# Patient Record
Sex: FEMALE | Race: WHITE | NOT HISPANIC OR LATINO | URBAN - METROPOLITAN AREA
[De-identification: names, ages, dates, MRNs, and addresses within clinical notes are randomized per-mention and may not be internally consistent; named-entity substitution may affect disease eponyms.]

---

## 2024-11-05 ENCOUNTER — EMERGENCY (EMERGENCY)
Facility: HOSPITAL | Age: 34
LOS: 1 days | Discharge: HOME | End: 2024-11-05
Attending: EMERGENCY MEDICINE | Admitting: EMERGENCY MEDICINE
Payer: SELF-PAY

## 2024-11-05 VITALS
SYSTOLIC BLOOD PRESSURE: 112 MMHG | RESPIRATION RATE: 17 BRPM | OXYGEN SATURATION: 98 % | HEART RATE: 138 BPM | TEMPERATURE: 98 F | DIASTOLIC BLOOD PRESSURE: 78 MMHG

## 2024-11-05 VITALS
SYSTOLIC BLOOD PRESSURE: 118 MMHG | RESPIRATION RATE: 18 BRPM | HEART RATE: 89 BPM | OXYGEN SATURATION: 99 % | TEMPERATURE: 98 F | DIASTOLIC BLOOD PRESSURE: 80 MMHG

## 2024-11-05 LAB — HCG UR QL: NEGATIVE — SIGNIFICANT CHANGE UP

## 2024-11-05 PROCEDURE — 99222 1ST HOSP IP/OBS MODERATE 55: CPT

## 2024-11-05 PROCEDURE — 99053 MED SERV 10PM-8AM 24 HR FAC: CPT

## 2024-11-05 RX ORDER — IBUPROFEN 200 MG
600 TABLET ORAL ONCE
Refills: 0 | Status: COMPLETED | OUTPATIENT
Start: 2024-11-05 | End: 2024-11-05

## 2024-11-05 RX ORDER — ACETAMINOPHEN 500 MG
975 TABLET ORAL ONCE
Refills: 0 | Status: COMPLETED | OUTPATIENT
Start: 2024-11-05 | End: 2024-11-05

## 2024-11-05 RX ADMIN — Medication 600 MILLIGRAM(S): at 03:39

## 2024-11-05 RX ADMIN — Medication 975 MILLIGRAM(S): at 03:38
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
## 2024-11-05 NOTE — ED PROVIDER NOTE - NSFOLLOWUPINSTRUCTIONS_ED_ALL_ED_FT
Intimate Partner Violence: [place]  Intimate partner violence, also called domestic violence, domestic abuse, or relationship abuse, is a pattern of behaviors used by one partner to gain or maintain power and control over the other partner. Intimate partner violence can happen to women and men and can happen between people who are or were:  .  Dating.  Living together.  It is important to be honest with yourself. If you are in a relationship that is violent, it is critical to take action.    [place] law    [place] law defines domestic violence as:    "A pattern of coercive tactics which can include physical, psychological, sexual, economic, and emotional abuse, perpetrated by one person against an adult intimate partner, with the goal of establishing and maintaining power and control over the victim."    What are the types of intimate partner violence?  Different types of abuse can occur at the same time within the same relationship.  Physical abuse. This includes rough handling, threats with a weapon, throwing objects, pushing, or hitting.  Emotional and psychological abuse. This includes verbal attacks, rejection, humiliation, intimidation, social isolation, or threats. Abuse may also include limiting contact with family and friends.  Sexual assault. Sexual assault is any unwanted sexual activity that occurs without clear permission (consent) from both people. This includes unwanted touching and sexual harassment.  Economic abuse. This includes controlling money, food, transportation, or other belongings.  Stalking. This involves such things as repeated, unwanted phone calls, e-mails, or text messages, or watching the partner affected by the abuse from a distance.  What are some warning signs of intimate partner violence?  Physical signs    Bruises.  Broken bones.  Burns or cuts.  Physical pain.  Head injury.  Emotional and psychological signs    Symptoms of depression, such as:  Tearfulness.  Hopelessness.  Trouble sleeping.  Suicidal thoughts or behavior.  Low self-esteem.  Symptoms of anxiety, such as:  Nervousness.  Fear of the partner.  Heightened startle response.  Avoiding others.  Flashbacks.  Sexual signs    Bruising, swelling, or bleeding of the genital or rectal area.  Signs of an STI, such as genital sores, warts, or discharge coming from the genital area.  Pain in the genital area.  Unintended pregnancy.  Problems with pregnancy.  Fear of having sex.  What are common behaviors of those affected by intimate partner violence?  Those affected by intimate partner violence may:  Be late to work or other events.  Avoid social activities.  Be isolated or kept from seeing friends or family and may have to let their partner know where they are and who they are with.  Make comments about their partner's temper and make excuses for their partner's behavior.  Engage in high-risk sexual behaviors.  Use drugs or alcohol.  Have unhealthy eating behaviors.  What are common feelings of those affected by intimate partner violence?  Those affected by intimate partner violence may feel that they:  Must be careful not to say or do things that trigger their partner's anger.  Cannot do anything right and deserve to be treated badly.  Overreact to their partner's behavior or temper.  Cannot trust other people or cannot trust their own feelings. They often feel emotionally drained or numb.  Are trapped and in danger if they try to leave.  May have their children taken away by their partner.  Where can you get help?  If you do not feel safe searching for help online at home, use a computer at a [employer] to access the internet. Call 981 if you are in immediate danger or need medical help.    Intimate partner violence hotlines and websites    The [place] Domestic Violence Hotline.  24-hour phone hotline: 1-881.625.3157 (SAFE) or 1-973.385.6153 (TTY).  Text: "START" to 72247.  Online: [employer].SimpleHoney  The [facility] Domestic Violence Hotline. This service is available in multiple languages.  Call: 1-985.863.8354. If you are deaf or hard of hearing, dial 711.  The [facility] Domestic Violence Hotline.  Call: 1-848.720.1559 ([employer]). If you are deaf or hard of hearing, dial 311.  The [place] Sexual Assault Hotline.  24-hour phone hotline: 1-411.460.9524 ([employer]anning).  Online: [employer].SimpleHoney  Shelters for persons affected by intimate partner violence    If you are a person who has experienced intimate partner violence, there are resources to help you find a temporary place for you and your children to live (shelter). The specific address of these shelters is often not known to the public. A complete list, by [facility], of domestic counseling centers and shelters in [facility] is online: www.[facility].org/find-help/program-directory.html    Police    Report assaults, threats, and stalking to the police.    Counselors and counseling centers      Counseling can help you manage difficult emotions and empower you to plan for your future safety. The topics you discuss with a counselor are private and confidential. Children of those affected by intimate partner violence might also need counseling to manage stress and anxiety.    The court system    You can work with a  or an advocate to get legal protection against an abuser. Protection includes restraining orders and private addresses. Crimes against you, such as assault, can also be prosecuted through the courts.    For legal information, contact [facility]:  1-866.822.3330 ([place])  Gopeers.org  Follow these instructions at home:  Create a safety plan that includes ways to remain safe while in a relationship with a person who is abusive, while you are planning to leave, or after you leave. This plan may be created by the person affected by intimate partner violence alone or with assistance from the domestic violence hotline staff or local shelter staff. Your safety plan may include:  How to manage emotions.  How to tell friends and family about the abuse.  How to take legal action.  How to create a safe home environment.  How to keep your children safe.  Emergency plans for life-threatening situations.  Get help right away if:  You feel like you are in immediate danger.  You feel like you may hurt yourself or others.  If you ever feel like you may hurt yourself or others, or have thoughts about taking your own life, get help right away. Go to your nearest emergency department or:  Call your local emergency services (911 in the [place].S[place]  Call a suicide crisis helpline, such as the National Suicide Prevention Lifeline at 1-196.863.7573 or 507 in the [place].S. This is open 24 hours a day in the [place].  Text the Crisis Text Line at 747974 (in the U.S.).  Summary  Intimate partner violence is a pattern of behaviors used by one partner to gain or maintain power and control over the other partner.  It is important to be honest with yourself. If you are in a relationship that is violent, it is critical to take action.  If you have experienced intimate partner violence, there are resources to help you find a temporary place for you and your children to live (shelter).  If you are in a relationship with a person who is abusive, there are resources available to you to find ways to leave the relationship.  Create a safety plan to find ways to remain safe while in a relationship with a person who is abusive, while you are planning to leave, or after you leave.  This information is not intended to replace advice given to you by your health care provider. Make sure you discuss any questions you have with your health care provider.
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
## 2024-11-05 NOTE — ED PROVIDER NOTE - EYES, MLM
Clear bilaterally, pupils equal, round and reactive to light. there is R sided scant periorbital echymosis

## 2024-11-05 NOTE — ED CDU PROVIDER INITIAL DAY NOTE - NSFOLLOWUPINSTRUCTIONS_ED_ALL_ED_FT
Intimate Partner Violence: [place]  Intimate partner violence, also called domestic violence, domestic abuse, or relationship abuse, is a pattern of behaviors used by one partner to gain or maintain power and control over the other partner. Intimate partner violence can happen to women and men and can happen between people who are or were:  .  Dating.  Living together.  It is important to be honest with yourself. If you are in a relationship that is violent, it is critical to take action.    [place] law    [place] law defines domestic violence as:    "A pattern of coercive tactics which can include physical, psychological, sexual, economic, and emotional abuse, perpetrated by one person against an adult intimate partner, with the goal of establishing and maintaining power and control over the victim."    What are the types of intimate partner violence?  Different types of abuse can occur at the same time within the same relationship.  Physical abuse. This includes rough handling, threats with a weapon, throwing objects, pushing, or hitting.  Emotional and psychological abuse. This includes verbal attacks, rejection, humiliation, intimidation, social isolation, or threats. Abuse may also include limiting contact with family and friends.  Sexual assault. Sexual assault is any unwanted sexual activity that occurs without clear permission (consent) from both people. This includes unwanted touching and sexual harassment.  Economic abuse. This includes controlling money, food, transportation, or other belongings.  Stalking. This involves such things as repeated, unwanted phone calls, e-mails, or text messages, or watching the partner affected by the abuse from a distance.  What are some warning signs of intimate partner violence?  Physical signs    Bruises.  Broken bones.  Burns or cuts.  Physical pain.  Head injury.  Emotional and psychological signs    Symptoms of depression, such as:  Tearfulness.  Hopelessness.  Trouble sleeping.  Suicidal thoughts or behavior.  Low self-esteem.  Symptoms of anxiety, such as:  Nervousness.  Fear of the partner.  Heightened startle response.  Avoiding others.  Flashbacks.  Sexual signs    Bruising, swelling, or bleeding of the genital or rectal area.  Signs of an STI, such as genital sores, warts, or discharge coming from the genital area.  Pain in the genital area.  Unintended pregnancy.  Problems with pregnancy.  Fear of having sex.  What are common behaviors of those affected by intimate partner violence?  Those affected by intimate partner violence may:  Be late to work or other events.  Avoid social activities.  Be isolated or kept from seeing friends or family and may have to let their partner know where they are and who they are with.  Make comments about their partner's temper and make excuses for their partner's behavior.  Engage in high-risk sexual behaviors.  Use drugs or alcohol.  Have unhealthy eating behaviors.  What are common feelings of those affected by intimate partner violence?  Those affected by intimate partner violence may feel that they:  Must be careful not to say or do things that trigger their partner's anger.  Cannot do anything right and deserve to be treated badly.  Overreact to their partner's behavior or temper.  Cannot trust other people or cannot trust their own feelings. They often feel emotionally drained or numb.  Are trapped and in danger if they try to leave.  May have their children taken away by their partner.  Where can you get help?  If you do not feel safe searching for help online at home, use a computer at a [employer] to access the internet. Call 061 if you are in immediate danger or need medical help.    Intimate partner violence hotlines and websites    The [place] Domestic Violence Hotline.  24-hour phone hotline: 1-914.613.8528 (SAFE) or 1-802.166.6451 (TTY).  Text: "START" to 98550.  Online: [employer].Nexmo  The [facility] Domestic Violence Hotline. This service is available in multiple languages.  Call: 1-364.985.4323. If you are deaf or hard of hearing, dial 711.  The [facility] Domestic Violence Hotline.  Call: 1-107.811.3285 ([employer]). If you are deaf or hard of hearing, dial 311.  The [place] Sexual Assault Hotline.  24-hour phone hotline: 1-665.465.9866 ([employer]).  Online: [employer].Nexmo  Shelters for persons affected by intimate partner violence    If you are a person who has experienced intimate partner violence, there are resources to help you find a temporary place for you and your children to live (shelter). The specific address of these shelters is often not known to the public. A complete list, by [facility], of domestic counseling centers and shelters in [facility] is online: www.[facility].org/find-help/program-directory.html    Police    Report assaults, threats, and stalking to the police.    Counselors and counseling centers      Counseling can help you manage difficult emotions and empower you to plan for your future safety. The topics you discuss with a counselor are private and confidential. Children of those affected by intimate partner violence might also need counseling to manage stress and anxiety.    The court system    You can work with a  or an advocate to get legal protection against an abuser. Protection includes restraining orders and private addresses. Crimes against you, such as assault, can also be prosecuted through the courts.    For legal information, contact [facility]:  1-323.864.1839 ([place])  G.I. Java.org  Follow these instructions at home:  Create a safety plan that includes ways to remain safe while in a relationship with a person who is abusive, while you are planning to leave, or after you leave. This plan may be created by the person affected by intimate partner violence alone or with assistance from the domestic violence hotline staff or local shelter staff. Your safety plan may include:  How to manage emotions.  How to tell friends and family about the abuse.  How to take legal action.  How to create a safe home environment.  How to keep your children safe.  Emergency plans for life-threatening situations.  Get help right away if:  You feel like you are in immediate danger.  You feel like you may hurt yourself or others.  If you ever feel like you may hurt yourself or others, or have thoughts about taking your own life, get help right away. Go to your nearest emergency department or:  Call your local emergency services (911 in the [place].S.)  Call a suicide crisis helpline, such as the National Suicide Prevention Lifeline at 1-103.126.1396 or 928 in the [place].S. This is open 24 hours a day in the [place].S.  Text the Crisis Text Line at 896349 (in the U.S.).  Summary  Intimate partner violence is a pattern of behaviors used by one partner to gain or maintain power and control over the other partner.  It is important to be honest with yourself. If you are in a relationship that is violent, it is critical to take action.  If you have experienced intimate partner violence, there are resources to help you find a temporary place for you and your children to live (shelter).  If you are in a relationship with a person who is abusive, there are resources available to you to find ways to leave the relationship.  Create a safety plan to find ways to remain safe while in a relationship with a person who is abusive, while you are planning to leave, or after you leave.  This information is not intended to replace advice given to you by your health care provider. Make sure you discuss any questions you have with your health care provider.
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
## 2024-11-05 NOTE — ED CDU PROVIDER DISPOSITION NOTE - NSFOLLOWUPINSTRUCTIONS_ED_ALL_ED_FT
General Assault  Assault includes any behavior or physical attack that results in injury or threat to another person or damage to their property. This also includes assault that has not yet happened but is planned to happen, as well as threats that cause fear of assault.    Threats of assault may be physical, verbal, or written. They may be spoken or sent by any form of communication or media. The threats may be direct, implied, or understood.    What are the different forms of assault?  Forms of assault include:  Physically assaulting a person directly by slapping, hitting, kicking, or pushing.  Threats to inflict physical harm, which may include:  Verbal threats with language that is intimidating, hostile, or abusive.  Throwing or hitting objects.  Making intimidating or threatening gestures.  Displaying an object that appears to be a weapon in a threatening manner.  Stalking.  Sexually assaulting a person. Sexual assault is any sexual activity that a person is forced, threatened, or coerced to participate in. It may or may not involve physical contact with the person who is assaulting you. You are sexually assaulted if you are forced to have sexual contact of any kind.  Damaging or destroying a person's assistive equipment, such as glasses, canes, or walkers.  Using or displaying a weapon to harm or threaten someone. Examples of weapons may include guns, knives, sticks, or bats.  Using greater physical size or strength to intimidate someone by restraining them with force or bullying.  What can I do if I experience assault?    Report assaults, threats, and stalking to the police. Call your local emergency services (911 in the [place] if you are in immediate danger or you need medical help.  Work with a  or an advocate to get legal protection against someone who has assaulted you or threatened you with assault. Protection options may include:  Getting a court order that requires the person to stay away from you (restraining order).  Moving you to a private address.  Prosecuting the person through the courts. Laws vary depending on where you live.  Follow these instructions at home:  Avoid areas where you feel unsafe.  Try to stay in areas that are around other people.  Consider learning methods of protection from assault, such as self-defense.  Where to find support    If you have experienced assault, you may seek help from:  A professional counselor, family member, clergy, or a trusted friend to talk about what happened.  [facility] Sexual Assault Hotline: 823.678.9447 ([place]). Live chat is also available at [employer].Contests4Causes  The National Center for Victims of Crime. This is an advocacy center that provides information for people who have been assaulted or subjected to violence. Visit www.victimsofcrime.org  Summary  Assault includes any behavior or physical attack that results in injury or threat to another person or damage to their property.  An assault includes threats that cause a person to fear for his or her safety. Threats may be spoken or sent by any form of communication or media.  There are many forms of assault.  Report assaults, threats, and stalking to the police. Call your local emergency services (911 in the [place] if you are in immediate danger or you need medical help.  Prevent assault by being aware of your surroundings, avoiding areas where you feel unsafe, and talking to a  about getting legal protection against someone who has assaulted you or threatened you with assault.  This information is not intended to replace advice given to you by your health care provider. Make sure you discuss any questions you have with your health care provider.

## 2024-11-05 NOTE — ED CDU PROVIDER DISPOSITION NOTE - PATIENT PORTAL LINK FT
You can access the FollowMyHealth Patient Portal offered by [facility] by registering at the following website: [email or web address]. By joining [employer]’s FollowMyHealth portal, you will also be able to view your health information using other applications (apps) compatible with our system.
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
